# Patient Record
Sex: FEMALE | Race: WHITE | NOT HISPANIC OR LATINO | Employment: UNEMPLOYED | URBAN - METROPOLITAN AREA
[De-identification: names, ages, dates, MRNs, and addresses within clinical notes are randomized per-mention and may not be internally consistent; named-entity substitution may affect disease eponyms.]

---

## 2017-03-02 ENCOUNTER — OFFICE VISIT (OUTPATIENT)
Dept: NEUROLOGY | Facility: CLINIC | Age: 57
End: 2017-03-02

## 2017-03-02 VITALS
WEIGHT: 174 LBS | HEIGHT: 62 IN | HEART RATE: 59 BPM | DIASTOLIC BLOOD PRESSURE: 78 MMHG | SYSTOLIC BLOOD PRESSURE: 121 MMHG | BODY MASS INDEX: 32.02 KG/M2

## 2017-03-02 DIAGNOSIS — G93.1 HYPOXIC ENCEPHALOPATHY (HCC): Primary | ICD-10-CM

## 2017-03-02 DIAGNOSIS — M79.7 FIBROMYALGIA: ICD-10-CM

## 2017-03-02 PROCEDURE — 99213 OFFICE O/P EST LOW 20 MIN: CPT | Performed by: PSYCHIATRY & NEUROLOGY

## 2017-03-02 NOTE — PROGRESS NOTES
Subjective:     Patient ID: Latrice Ponce is a 57 y.o. female.    History of Present Illness  The following portions of the patient's history were reviewed and updated as appropriate: allergies, current medications, past family history, past medical history, past social history, past surgical history and problem list.  Patient has been gradually improving, talking some, getting up with help, denies complaints.  Review of Systems   Constitutional: Negative for chills, fatigue, fever and unexpected weight change.   HENT: Negative for ear pain, hearing loss, nosebleeds, rhinorrhea and sore throat.    Eyes: Negative for photophobia, pain, discharge, itching and visual disturbance.   Respiratory: Negative for cough, chest tightness, shortness of breath and wheezing.    Cardiovascular: Negative for chest pain, palpitations and leg swelling.   Gastrointestinal: Negative for abdominal pain, blood in stool, constipation, diarrhea, nausea and vomiting.   Genitourinary: Negative for dysuria, frequency, hematuria and urgency.   Musculoskeletal: Negative for arthralgias, back pain, gait problem, joint swelling, myalgias, neck pain and neck stiffness.   Skin: Negative for rash and wound.   Allergic/Immunologic: Negative for environmental allergies and food allergies.   Neurological: Negative for dizziness, tremors, seizures, syncope, speech difficulty, weakness, light-headedness, numbness and headaches.   Hematological: Negative for adenopathy. Does not bruise/bleed easily.   Psychiatric/Behavioral: Negative for agitation, confusion, decreased concentration, hallucinations, sleep disturbance and suicidal ideas. The patient is not nervous/anxious.         Objective:    Neurologic Exam     Mental Status   Oriented to person, place, and time.       Physical Exam   Constitutional: She is oriented to person, place, and time. She appears well-developed and well-nourished.   Cardiovascular: Normal rate and regular rhythm.     Pulmonary/Chest: Effort normal.   Neurological: She is alert and oriented to person, place, and time. She has normal reflexes.   In a wheelchair, answers simple questions, speech slow and dysarthric, moves limb against gravity.   Psychiatric: She has a normal mood and affect. Her behavior is normal. Thought content normal.       Assessment/Plan:     Latrice was seen today for headache.    Diagnoses and all orders for this visit:    Hypoxic encephalopathy    Fibromyalgia     Encouraged to continue PT, OT, ST.

## 2017-09-07 ENCOUNTER — OFFICE VISIT (OUTPATIENT)
Dept: NEUROLOGY | Facility: CLINIC | Age: 57
End: 2017-09-07

## 2017-09-07 VITALS — OXYGEN SATURATION: 96 % | HEART RATE: 70 BPM | DIASTOLIC BLOOD PRESSURE: 65 MMHG | SYSTOLIC BLOOD PRESSURE: 142 MMHG

## 2017-09-07 DIAGNOSIS — R56.9 GENERALIZED CONVULSIVE SEIZURE (HCC): ICD-10-CM

## 2017-09-07 DIAGNOSIS — M79.7 FIBROMYALGIA: ICD-10-CM

## 2017-09-07 DIAGNOSIS — G43.009 MIGRAINE WITHOUT AURA AND WITHOUT STATUS MIGRAINOSUS, NOT INTRACTABLE: ICD-10-CM

## 2017-09-07 DIAGNOSIS — G93.1 HYPOXIC ENCEPHALOPATHY (HCC): Primary | ICD-10-CM

## 2017-09-07 PROCEDURE — 99214 OFFICE O/P EST MOD 30 MIN: CPT | Performed by: PSYCHIATRY & NEUROLOGY

## 2017-09-07 RX ORDER — ESOMEPRAZOLE MAGNESIUM 40 MG/1
40 CAPSULE, DELAYED RELEASE ORAL
COMMUNITY

## 2017-09-07 RX ORDER — TRAZODONE HYDROCHLORIDE 50 MG/1
50 TABLET ORAL NIGHTLY
COMMUNITY

## 2017-09-07 RX ORDER — CETIRIZINE HYDROCHLORIDE 10 MG/1
10 TABLET ORAL DAILY
COMMUNITY

## 2017-09-07 RX ORDER — LISINOPRIL 10 MG/1
10 TABLET ORAL DAILY
COMMUNITY

## 2017-09-07 RX ORDER — LORAZEPAM 1 MG/1
1 TABLET ORAL EVERY 8 HOURS PRN
COMMUNITY

## 2017-09-07 RX ORDER — OXYCODONE HYDROCHLORIDE 5 MG/1
5 CAPSULE ORAL EVERY 4 HOURS PRN
COMMUNITY
End: 2019-09-25

## 2017-09-07 NOTE — PROGRESS NOTES
Subjective:     Patient ID: Latrice Ponce is a 57 y.o. female.    HPI:   History of Present Illness  The following portions of the patient's history were reviewed and updated as appropriate: allergies, current medications, past family history, past medical history, past social history, past surgical history and problem list.     Patient remains in a nursing home. She has been getting up with PT, mostly in a wheelchair, has been eating well. No seizures reported.    Past Medical History:   Diagnosis Date   • Anxiety    • Depression    • Diabetes mellitus    • Fibromyositis    • GERD (gastroesophageal reflux disease)    • Headache    • Hypertension    • Myofascial pain syndrome    • Seizures        Past Surgical History:   Procedure Laterality Date   • CHOLECYSTECTOMY     • FOOT SURGERY     • HYSTERECTOMY     • TUBAL ABDOMINAL LIGATION         Social History     Social History   • Marital status:      Spouse name: N/A   • Number of children: N/A   • Years of education: N/A     Occupational History   • Not on file.     Social History Main Topics   • Smoking status: Never Smoker   • Smokeless tobacco: Not on file   • Alcohol use Defer   • Drug use: Defer   • Sexual activity: Defer     Other Topics Concern   • Not on file     Social History Narrative       Family History   Problem Relation Age of Onset   • Family history unknown: Yes        Review of Systems   Constitutional: Negative for chills, fatigue, fever and unexpected weight change.   HENT: Positive for ear pain. Negative for hearing loss, nosebleeds, rhinorrhea and sore throat.    Eyes: Negative for photophobia, pain, discharge, itching and visual disturbance.   Respiratory: Negative for cough, chest tightness, shortness of breath and wheezing.    Cardiovascular: Negative for chest pain, palpitations and leg swelling.   Gastrointestinal: Negative for abdominal pain, blood in stool, constipation, diarrhea, nausea and vomiting.   Genitourinary: Negative  for dysuria, frequency, hematuria and urgency.   Musculoskeletal: Positive for back pain. Negative for arthralgias, gait problem, joint swelling, myalgias, neck pain and neck stiffness.   Skin: Negative for rash and wound.   Allergic/Immunologic: Negative for environmental allergies and food allergies.   Neurological: Positive for headaches. Negative for dizziness, tremors, seizures, syncope, speech difficulty, weakness, light-headedness and numbness.   Hematological: Negative for adenopathy. Does not bruise/bleed easily.   Psychiatric/Behavioral: Negative for agitation, confusion, decreased concentration, hallucinations, sleep disturbance and suicidal ideas. The patient is not nervous/anxious.         Objective:    Neurologic Exam     Mental Status   Oriented to person, place, and time.       Physical Exam   Constitutional: She is oriented to person, place, and time. She appears well-developed and well-nourished.   Cardiovascular: Normal rate and regular rhythm.    Pulmonary/Chest: Effort normal.   Neurological: She is alert and oriented to person, place, and time.   In a wheelchair. Speech nonfluent. Oriented to name. Follows commands, moves limbs against gravity, better on the right.   Psychiatric: She has a normal mood and affect. Her behavior is normal. Thought content normal.       Assessment/Plan:       aLtrice was seen today for hypoxic encephalopathy and asphasia.    Diagnoses and all orders for this visit:    Hypoxic encephalopathy  - continue ST, OT, PT, fall precautions  Fibromyalgia  - continue gabapentin  Migraine without aura and without status migrainosus, not intractable  - continue Topamax  Generalized convulsive seizure  - continue current meds         Ismael Washington MD  9/7/2017

## 2018-10-04 ENCOUNTER — OFFICE VISIT (OUTPATIENT)
Dept: NEUROLOGY | Facility: CLINIC | Age: 58
End: 2018-10-04

## 2018-10-04 VITALS — HEIGHT: 62 IN | SYSTOLIC BLOOD PRESSURE: 136 MMHG | DIASTOLIC BLOOD PRESSURE: 86 MMHG | RESPIRATION RATE: 18 BRPM

## 2018-10-04 DIAGNOSIS — M79.7 FIBROMYALGIA: ICD-10-CM

## 2018-10-04 DIAGNOSIS — G81.94 LEFT HEMIPARESIS (HCC): ICD-10-CM

## 2018-10-04 DIAGNOSIS — G93.1 HYPOXIC ENCEPHALOPATHY (HCC): ICD-10-CM

## 2018-10-04 DIAGNOSIS — R47.01 APHASIA: Primary | ICD-10-CM

## 2018-10-04 DIAGNOSIS — R56.9 GENERALIZED CONVULSIVE SEIZURE (HCC): ICD-10-CM

## 2018-10-04 PROCEDURE — 99213 OFFICE O/P EST LOW 20 MIN: CPT | Performed by: PSYCHIATRY & NEUROLOGY

## 2018-10-04 RX ORDER — DICYCLOMINE HYDROCHLORIDE 10 MG/1
CAPSULE ORAL
COMMUNITY
End: 2019-09-25

## 2018-10-04 RX ORDER — HYDROCODONE BITARTRATE AND ACETAMINOPHEN 7.5; 325 MG/1; MG/1
TABLET ORAL
COMMUNITY
End: 2019-09-25

## 2018-10-04 RX ORDER — SERTRALINE HYDROCHLORIDE 25 MG/1
TABLET, FILM COATED ORAL
COMMUNITY
End: 2019-09-25

## 2018-10-04 RX ORDER — METRONIDAZOLE 500 MG/1
TABLET ORAL
COMMUNITY
End: 2019-09-25

## 2018-10-04 RX ORDER — PANTOPRAZOLE SODIUM 40 MG/1
TABLET, DELAYED RELEASE ORAL
COMMUNITY
End: 2019-09-25

## 2018-10-04 NOTE — PROGRESS NOTES
Subjective:     Patient ID: Latrice Ponce is a 58 y.o. female.    CC:   Chief Complaint   Patient presents with   • Fibromyalgia       HPI:   History of Present Illness  The following portions of the patient's history were reviewed and updated as appropriate: allergies, current medications, past family history, past medical history, past social history, past surgical history and problem list.     Gets up with help only, has been talking a bit more, rests well, eats well. No seizures reported. She remains on Topamax, Trileptal, gabapentin.    Past Medical History:   Diagnosis Date   • Anxiety    • Depression    • Diabetes mellitus (CMS/HCC)    • Fibromyositis    • GERD (gastroesophageal reflux disease)    • Headache    • Hypertension    • Myofascial pain syndrome    • Seizures (CMS/HCC)        Past Surgical History:   Procedure Laterality Date   • CHOLECYSTECTOMY     • FOOT SURGERY     • HYSTERECTOMY     • TUBAL ABDOMINAL LIGATION         Social History     Social History   • Marital status:      Spouse name: N/A   • Number of children: N/A   • Years of education: N/A     Occupational History   • Not on file.     Social History Main Topics   • Smoking status: Never Smoker   • Smokeless tobacco: Not on file   • Alcohol use Defer   • Drug use: Unknown   • Sexual activity: Defer     Other Topics Concern   • Not on file     Social History Narrative   • No narrative on file       Family History   Problem Relation Age of Onset   • Family history unknown: Yes        Review of Systems   Constitutional: Negative for chills, fatigue, fever and unexpected weight change.   HENT: Negative for ear pain, hearing loss, nosebleeds, rhinorrhea and sore throat.    Eyes: Negative for photophobia, pain, discharge, itching and visual disturbance.   Respiratory: Negative for cough, chest tightness, shortness of breath and wheezing.    Cardiovascular: Negative for chest pain, palpitations and leg swelling.   Gastrointestinal:  Negative for abdominal pain, blood in stool, constipation, diarrhea, nausea and vomiting.   Genitourinary: Negative for dysuria, frequency, hematuria and urgency.   Musculoskeletal: Negative for arthralgias, back pain, gait problem, joint swelling, myalgias, neck pain and neck stiffness.   Skin: Negative for rash and wound.   Allergic/Immunologic: Negative for environmental allergies and food allergies.   Neurological: Negative for dizziness, tremors, seizures, syncope, speech difficulty, weakness, light-headedness, numbness and headaches.   Hematological: Negative for adenopathy. Does not bruise/bleed easily.   Psychiatric/Behavioral: Negative for agitation, confusion, decreased concentration, hallucinations, sleep disturbance and suicidal ideas. The patient is not nervous/anxious.         Objective:    Neurologic Exam    Physical Exam   Cardiovascular: Normal rate and regular rhythm.    Neurological:   In a wheelchair, expressive aphasia unchanged, mostly yes/no responses, follows commands, moves limbs against gravity.       Assessment/Plan:       Latrice was seen today for fibromyalgia.    Diagnoses and all orders for this visit:    Aphasia     -  At MMI  Hypoxic encephalopathy (CMS/HCC)     -  Overall improved, chronic  Left hemiparesis (CMS/HCC)     -  Improved.  Generalized convulsive seizure (CMS/HCC)     -  None reported, continue current meds.           Ismael Washington MD  10/4/2018

## 2019-09-25 ENCOUNTER — OFFICE VISIT (OUTPATIENT)
Dept: NEUROLOGY | Facility: CLINIC | Age: 59
End: 2019-09-25

## 2019-09-25 VITALS
HEART RATE: 59 BPM | OXYGEN SATURATION: 91 % | SYSTOLIC BLOOD PRESSURE: 134 MMHG | HEIGHT: 64 IN | BODY MASS INDEX: 29.87 KG/M2 | DIASTOLIC BLOOD PRESSURE: 80 MMHG

## 2019-09-25 DIAGNOSIS — G81.94 LEFT HEMIPARESIS (HCC): ICD-10-CM

## 2019-09-25 DIAGNOSIS — R56.9 GENERALIZED CONVULSIVE SEIZURE (HCC): ICD-10-CM

## 2019-09-25 DIAGNOSIS — R47.01 APHASIA: ICD-10-CM

## 2019-09-25 DIAGNOSIS — G93.1 HYPOXIC ENCEPHALOPATHY (HCC): Primary | ICD-10-CM

## 2019-09-25 PROCEDURE — 99213 OFFICE O/P EST LOW 20 MIN: CPT | Performed by: NURSE PRACTITIONER

## 2019-09-25 RX ORDER — ONDANSETRON 4 MG/1
4 TABLET, FILM COATED ORAL EVERY 8 HOURS PRN
COMMUNITY

## 2019-09-25 RX ORDER — LACTULOSE 10 G/15ML
20 SOLUTION ORAL 2 TIMES DAILY PRN
COMMUNITY

## 2019-09-25 RX ORDER — BUSPIRONE HYDROCHLORIDE 10 MG/1
10 TABLET ORAL 3 TIMES DAILY
COMMUNITY

## 2019-09-25 RX ORDER — DIPHENHYDRAMINE HYDROCHLORIDE, ZINC ACETATE 2; .1 G/100G; G/100G
CREAM TOPICAL 3 TIMES DAILY PRN
COMMUNITY

## 2019-09-25 RX ORDER — OXYCODONE HYDROCHLORIDE AND ACETAMINOPHEN 5; 325 MG/1; MG/1
1 TABLET ORAL EVERY 6 HOURS PRN
COMMUNITY

## 2019-09-25 RX ORDER — ACETAMINOPHEN 500 MG
500 TABLET ORAL EVERY 6 HOURS PRN
COMMUNITY

## 2019-09-25 NOTE — PROGRESS NOTES
Subjective:     Patient ID: Latrice Ponce is a 59 y.o. female.    CC:   Chief Complaint   Patient presents with   • Seizures       HPI:   History of Present Illness     This is a 59-year-old female who presents for annual neurology follow-up.  She has been followed by Dr. Washington since 2013 or even before that time.  She is accompanied by her son as well as her daughter.  Her family is not really sure of her history.  She currently resides in the [facility].  From the nursing home paperwork as well as her prior documented history I can gather that she had an accidental overdose of medication in June 2016 suffering a hypoxic encephalopathy with expressive and receptive aphasia as well as left hemiparesis.  Her son believes she had several seizures at that time.  She is currently on topiramate 200 mg twice a day, Trileptal 150 mg twice a day and gabapentin 800 mg 3 times a day to prevent seizures.  As far as the son is aware she has not had any seizures in the past year.  All of her orders are written by the [facility] attending physician Dr. Corby Calzada.  They have no new concerns today.  The patient herself can verbalize with some grunting and yes no answers.  She can follow some basic commands.  She rarely gets out clear words.  Denies any pain.  She is in a wheelchair today.  The son tells me that she can walk with some assistance.  She gets therapy at the nursing home intermittently.  She has some left-sided weakness.  Complains of occasional headaches.  Most recent MRI of the brain available for review in [facility] is from 9/29/2016 showing some moderate chronic small vessel ischemic changes scattered throughout the periventricular and subcortical white matter but no acute intracranial abnormality.  Previous EEG on 8/7/2014 was essentially normal at that time on antiepileptic medications.  She is diabetic and most recent hemoglobin A1c is 6% reviewed in nursing home paperwork and obtained on 9/16/2019.   CMP looks stable with mildly low sodium of 135.  CBC looks okay.  Lipid panel shows total cholesterol 180, LDL 72, HDL 98 and triglycerides 150.    The following portions of the patient's history were reviewed and updated as appropriate: allergies, current medications, past family history, past medical history, past social history, past surgical history and problem list.    Past Medical History:   Diagnosis Date   • Anxiety    • Depression    • Diabetes mellitus (CMS/HCC)    • Fibromyositis    • GERD (gastroesophageal reflux disease)    • Headache    • Hypertension    • Myofascial pain syndrome    • Seizures (CMS/HCC)        Past Surgical History:   Procedure Laterality Date   • CHOLECYSTECTOMY     • FOOT SURGERY     • HYSTERECTOMY     • TUBAL ABDOMINAL LIGATION         Social History     Socioeconomic History   • Marital status:      Spouse name: Not on file   • Number of children: Not on file   • Years of education: Not on file   • Highest education level: Not on file   Tobacco Use   • Smoking status: Never Smoker   Substance and Sexual Activity   • Alcohol use: Defer   • Drug use: Defer   • Sexual activity: Defer       Family History   Family history unknown: Yes        Review of Systems   Constitutional: Negative for chills, fatigue, fever and unexpected weight change.   HENT: Negative for ear pain, hearing loss, nosebleeds, rhinorrhea and sore throat.    Eyes: Negative for photophobia, pain, discharge, itching and visual disturbance.   Respiratory: Negative for cough, chest tightness, shortness of breath and wheezing.    Cardiovascular: Negative for chest pain, palpitations and leg swelling.   Gastrointestinal: Negative for abdominal pain, blood in stool, constipation, diarrhea, nausea and vomiting.   Genitourinary: Negative for dysuria, frequency, hematuria and urgency.   Musculoskeletal: Negative for arthralgias, back pain, gait problem, joint swelling, myalgias, neck pain and neck stiffness.   Skin:  Negative for rash and wound.   Allergic/Immunologic: Negative for environmental allergies and food allergies.   Neurological: Negative for dizziness, tremors, seizures, syncope, speech difficulty, weakness, light-headedness, numbness and headaches.   Hematological: Negative for adenopathy. Does not bruise/bleed easily.   Psychiatric/Behavioral: Negative for agitation, confusion, decreased concentration, hallucinations, sleep disturbance and suicidal ideas. The patient is not nervous/anxious.         Objective:    Neurologic Exam     Mental Status   Oriented to person.   Disoriented to place.   Disoriented to time.   Attention: decreased. Concentration: decreased.   Speech: (Expressive and receptive aphasia, can nod and shake some, can say yes and no at times, otherwise garbled speech)  Level of consciousness: alert  Knowledge: poor.     Cranial Nerves     CN II   Visual acuity: (VÍCTOR, cannot follow full neuro exam)    CN III, IV, VI   Pupils are equal, round, and reactive to light.    CN V   Facial sensation intact.     CN VII   Facial expression full, symmetric.     CN VIII   CN VIII normal.     CN IX, X   CN IX normal.   CN X normal.     CN XI   Right sternocleidomastoid strength: normal  Left sternocleidomastoid strength: weak  Right trapezius strength: normal  Left trapezius strength: weak    CN XII   CN XII normal.     VÍCTOR fully d/t aphasia and trouble following some commands     Motor Exam   Muscle bulk: normal  Overall muscle tone: normal    Strength   Strength 5/5 except as noted.   Left strength: Left sided weakness, generally weak, in wheelchair    Gait, Coordination, and Reflexes     Gait  Gait: (in wheelchair, not assessed today)    Coordination   Finger to nose coordination: abnormal    Reflexes   Right brachioradialis: 2+  Left brachioradialis: 2+  Right biceps: 2+  Left biceps: 2+  Right triceps: 2+  Left triceps: 2+  Right patellar: 2+  Left patellar: 2+  Right achilles: 2+  Left achilles: 2+  Right  : 2+  Left : 1+      Physical Exam   Eyes: Pupils are equal, round, and reactive to light.   Neurological: She has an abnormal Finger-Nose-Finger Test.   Reflex Scores:       Tricep reflexes are 2+ on the right side and 2+ on the left side.       Bicep reflexes are 2+ on the right side and 2+ on the left side.       Brachioradialis reflexes are 2+ on the right side and 2+ on the left side.       Patellar reflexes are 2+ on the right side and 2+ on the left side.       Achilles reflexes are 2+ on the right side and 2+ on the left side.  Psychiatric: Judgment and thought content normal. Her affect is labile. Her speech is delayed. She is slowed. Cognition and memory are impaired. She is noncommunicative (expressive and receptive aphasia but follows some commands, can answer some questions).       Assessment/Plan:       Latrice was seen today for seizures.    Diagnoses and all orders for this visit:    Hypoxic encephalopathy (CMS/[facility])  Comments:  PT/OT/ST PRN at Nursing Home-at baseline    Generalized convulsive seizure (CMS/[facility])  Comments:  continue topamax 200mg bid, trileptal 150mg bid & gabapentin 800mg tid as ordered at NH    Left hemiparesis (CMS/[facility])  Comments:  PT/OT/ST PRN at Nursing Home    Aphasia  Comments:  at baseline s/p hypoxic encephalopathy from accidental OD in 2016         Continue current meds and plan. F/U with Dr. Washington in 1 year or sooner if needed. Reviewed medications, potential side effects and signs and symptoms to report. Discussed risk versus benefits of treatment plan with patient and/or family-including medications, labs and radiology that may be ordered. Addressed questions and concerns during visit. Patient and/or family verbalized understanding and agree with plan.    Patient instructions include: No driving or operating heavy machinery or tub baths or solo bathing indefinitely. Minimize stress as much as possible. Recommended 7-8 hours of sleep each night. Abstain from  alcohol intake. Educated on Antiepileptic medications with possible side effects and signs and symptoms to report if prescribed during visit. Instructed to take seizure medication daily if prescribed. Reviewed potential seizure risk factors. Instructed to call 911 or our office if another seizure does occur.    During this visit the following were done:  Labs Reviewed [x]    Labs Ordered []    Radiology Reports Reviewed []    Radiology Ordered []    PCP Records Reviewed []    Referring Provider Records Reviewed []    ER Records Reviewed []    Hospital Records Reviewed []    History Obtained From Family [x]    Radiology Images Reviewed []    Other Reviewed [x]  Nursing home notes and labs  Records Requested []      EMR Dragon/Transcription Disclaimer:  Much of this encounter note is an electronic transcription of spoken language to printed text. Electronic transcription of spoken language may permit erroneous words or phrases to be inadvertently transcribed. Although I have reviewed the note for such errors, some may still exist in this documentation.      Rosa Elena Gandhi, APRN  9/25/2019

## 2020-09-03 PROBLEM — G40.909 SEIZURE DISORDER (HCC): Status: ACTIVE | Noted: 2020-09-03
